# Patient Record
Sex: FEMALE | Race: WHITE | NOT HISPANIC OR LATINO | Employment: UNEMPLOYED | URBAN - NONMETROPOLITAN AREA
[De-identification: names, ages, dates, MRNs, and addresses within clinical notes are randomized per-mention and may not be internally consistent; named-entity substitution may affect disease eponyms.]

---

## 2017-08-06 ENCOUNTER — HOSPITAL ENCOUNTER (EMERGENCY)
Facility: HOSPITAL | Age: 27
Discharge: HOME | End: 2017-08-06
Attending: FAMILY MEDICINE | Admitting: FAMILY MEDICINE

## 2017-08-06 ENCOUNTER — APPOINTMENT (OUTPATIENT)
Dept: GENERAL RADIOLOGY | Facility: HOSPITAL | Age: 27
End: 2017-08-06

## 2017-08-06 VITALS
SYSTOLIC BLOOD PRESSURE: 117 MMHG | TEMPERATURE: 98.1 F | HEIGHT: 63 IN | DIASTOLIC BLOOD PRESSURE: 79 MMHG | RESPIRATION RATE: 15 BRPM | OXYGEN SATURATION: 99 % | BODY MASS INDEX: 18.25 KG/M2 | WEIGHT: 103 LBS | HEART RATE: 87 BPM

## 2017-08-06 DIAGNOSIS — S81.812A LACERATION OF LEFT LOWER EXTREMITY, INITIAL ENCOUNTER: Primary | ICD-10-CM

## 2017-08-06 LAB
ALBUMIN SERPL-MCNC: 4.7 G/DL (ref 3.5–5)
ALBUMIN/GLOB SERPL: 1.6 G/DL (ref 1.5–2.5)
ALP SERPL-CCNC: 60 U/L (ref 35–104)
ALT SERPL W P-5'-P-CCNC: 9 U/L (ref 10–36)
ANION GAP SERPL CALCULATED.3IONS-SCNC: 4 MMOL/L (ref 3.6–11.2)
AST SERPL-CCNC: 20 U/L (ref 10–30)
BASOPHILS # BLD AUTO: 0.02 10*3/MM3 (ref 0–0.3)
BASOPHILS NFR BLD AUTO: 0.3 % (ref 0–2)
BILIRUB SERPL-MCNC: 0.4 MG/DL (ref 0.2–1.8)
BUN BLD-MCNC: 9 MG/DL (ref 7–21)
BUN/CREAT SERPL: 13 (ref 7–25)
CALCIUM SPEC-SCNC: 9.7 MG/DL (ref 7.7–10)
CHLORIDE SERPL-SCNC: 114 MMOL/L (ref 99–112)
CK SERPL-CCNC: 85 U/L (ref 24–173)
CO2 SERPL-SCNC: 25 MMOL/L (ref 24.3–31.9)
CREAT BLD-MCNC: 0.69 MG/DL (ref 0.43–1.29)
DEPRECATED RDW RBC AUTO: 44.8 FL (ref 37–54)
EOSINOPHIL # BLD AUTO: 0.03 10*3/MM3 (ref 0–0.7)
EOSINOPHIL NFR BLD AUTO: 0.4 % (ref 0–5)
ERYTHROCYTE [DISTWIDTH] IN BLOOD BY AUTOMATED COUNT: 13.7 % (ref 11.5–14.5)
GFR SERPL CREATININE-BSD FRML MDRD: 104 ML/MIN/1.73
GLOBULIN UR ELPH-MCNC: 2.9 GM/DL
GLUCOSE BLD-MCNC: 105 MG/DL (ref 70–110)
HCG SERPL QL: NEGATIVE
HCT VFR BLD AUTO: 44.6 % (ref 37–47)
HGB BLD-MCNC: 14.9 G/DL (ref 12–16)
IMM GRANULOCYTES # BLD: 0.08 10*3/MM3 (ref 0–0.03)
IMM GRANULOCYTES NFR BLD: 1.1 % (ref 0–0.5)
LYMPHOCYTES # BLD AUTO: 1.49 10*3/MM3 (ref 1–3)
LYMPHOCYTES NFR BLD AUTO: 19.6 % (ref 21–51)
MCH RBC QN AUTO: 29.9 PG (ref 27–33)
MCHC RBC AUTO-ENTMCNC: 33.4 G/DL (ref 33–37)
MCV RBC AUTO: 89.4 FL (ref 80–94)
MONOCYTES # BLD AUTO: 0.5 10*3/MM3 (ref 0.1–0.9)
MONOCYTES NFR BLD AUTO: 6.6 % (ref 0–10)
MYOGLOBIN SERPL-MCNC: 26 NG/ML (ref 0–109)
NEUTROPHILS # BLD AUTO: 5.49 10*3/MM3 (ref 1.4–6.5)
NEUTROPHILS NFR BLD AUTO: 72 % (ref 30–70)
OSMOLALITY SERPL CALC.SUM OF ELEC: 284 MOSM/KG (ref 273–305)
PLATELET # BLD AUTO: 185 10*3/MM3 (ref 130–400)
PMV BLD AUTO: 12.3 FL (ref 6–10)
POTASSIUM BLD-SCNC: 3.8 MMOL/L (ref 3.5–5.3)
PROT SERPL-MCNC: 7.6 G/DL (ref 6–8)
RBC # BLD AUTO: 4.99 10*6/MM3 (ref 4.2–5.4)
SODIUM BLD-SCNC: 143 MMOL/L (ref 135–153)
WBC NRBC COR # BLD: 7.61 10*3/MM3 (ref 4.5–12.5)

## 2017-08-06 PROCEDURE — 96374 THER/PROPH/DIAG INJ IV PUSH: CPT

## 2017-08-06 PROCEDURE — 73590 X-RAY EXAM OF LOWER LEG: CPT | Performed by: RADIOLOGY

## 2017-08-06 PROCEDURE — 73590 X-RAY EXAM OF LOWER LEG: CPT

## 2017-08-06 PROCEDURE — 96361 HYDRATE IV INFUSION ADD-ON: CPT

## 2017-08-06 PROCEDURE — 99284 EMERGENCY DEPT VISIT MOD MDM: CPT

## 2017-08-06 PROCEDURE — 80053 COMPREHEN METABOLIC PANEL: CPT | Performed by: FAMILY MEDICINE

## 2017-08-06 PROCEDURE — 82550 ASSAY OF CK (CPK): CPT | Performed by: FAMILY MEDICINE

## 2017-08-06 PROCEDURE — 84703 CHORIONIC GONADOTROPIN ASSAY: CPT | Performed by: FAMILY MEDICINE

## 2017-08-06 PROCEDURE — 96376 TX/PRO/DX INJ SAME DRUG ADON: CPT

## 2017-08-06 PROCEDURE — 96375 TX/PRO/DX INJ NEW DRUG ADDON: CPT

## 2017-08-06 PROCEDURE — 25010000002 HYDROMORPHONE PER 4 MG: Performed by: FAMILY MEDICINE

## 2017-08-06 PROCEDURE — 85025 COMPLETE CBC W/AUTO DIFF WBC: CPT | Performed by: FAMILY MEDICINE

## 2017-08-06 PROCEDURE — 25010000002 LORAZEPAM PER 2 MG

## 2017-08-06 PROCEDURE — 25010000002 HYDROMORPHONE PER 4 MG

## 2017-08-06 PROCEDURE — 83874 ASSAY OF MYOGLOBIN: CPT | Performed by: FAMILY MEDICINE

## 2017-08-06 RX ORDER — LIDOCAINE HYDROCHLORIDE 20 MG/ML
INJECTION, SOLUTION INFILTRATION; PERINEURAL
Status: COMPLETED
Start: 2017-08-06 | End: 2017-08-06

## 2017-08-06 RX ORDER — CLINDAMYCIN HYDROCHLORIDE 300 MG/1
300 CAPSULE ORAL 3 TIMES DAILY
Qty: 30 CAPSULE | Refills: 0 | Status: SHIPPED | OUTPATIENT
Start: 2017-08-06 | End: 2017-08-16

## 2017-08-06 RX ORDER — HYDROCODONE BITARTRATE AND ACETAMINOPHEN 5; 325 MG/1; MG/1
1 TABLET ORAL EVERY 6 HOURS PRN
Qty: 12 TABLET | Refills: 0 | Status: SHIPPED | OUTPATIENT
Start: 2017-08-06

## 2017-08-06 RX ORDER — CLINDAMYCIN HYDROCHLORIDE 150 MG/1
300 CAPSULE ORAL EVERY 6 HOURS SCHEDULED
Status: DISCONTINUED | OUTPATIENT
Start: 2017-08-06 | End: 2017-08-06 | Stop reason: HOSPADM

## 2017-08-06 RX ORDER — LIDOCAINE HYDROCHLORIDE 20 MG/ML
10 INJECTION, SOLUTION INFILTRATION; PERINEURAL ONCE
Status: COMPLETED | OUTPATIENT
Start: 2017-08-06 | End: 2017-08-06

## 2017-08-06 RX ORDER — LORAZEPAM 2 MG/ML
INJECTION INTRAMUSCULAR
Status: COMPLETED
Start: 2017-08-06 | End: 2017-08-06

## 2017-08-06 RX ORDER — HYDROMORPHONE HYDROCHLORIDE 1 MG/ML
0.5 INJECTION, SOLUTION INTRAMUSCULAR; INTRAVENOUS; SUBCUTANEOUS ONCE
Status: COMPLETED | OUTPATIENT
Start: 2017-08-06 | End: 2017-08-06

## 2017-08-06 RX ORDER — LORAZEPAM 2 MG/ML
0.5 INJECTION INTRAMUSCULAR ONCE
Status: COMPLETED | OUTPATIENT
Start: 2017-08-06 | End: 2017-08-06

## 2017-08-06 RX ORDER — LIDOCAINE HYDROCHLORIDE 10 MG/ML
10 INJECTION, SOLUTION EPIDURAL; INFILTRATION; INTRACAUDAL; PERINEURAL ONCE
Status: COMPLETED | OUTPATIENT
Start: 2017-08-06 | End: 2017-08-06

## 2017-08-06 RX ORDER — LIDOCAINE HYDROCHLORIDE 10 MG/ML
INJECTION, SOLUTION INFILTRATION; PERINEURAL
Status: COMPLETED
Start: 2017-08-06 | End: 2017-08-06

## 2017-08-06 RX ORDER — LORAZEPAM 2 MG/ML
1 INJECTION INTRAMUSCULAR ONCE
Status: DISCONTINUED | OUTPATIENT
Start: 2017-08-06 | End: 2017-08-06

## 2017-08-06 RX ORDER — LIDOCAINE HYDROCHLORIDE 10 MG/ML
INJECTION, SOLUTION INFILTRATION; PERINEURAL
Status: DISCONTINUED
Start: 2017-08-06 | End: 2017-08-06 | Stop reason: HOSPADM

## 2017-08-06 RX ADMIN — LORAZEPAM 0.5 MG: 2 INJECTION INTRAMUSCULAR at 15:02

## 2017-08-06 RX ADMIN — LIDOCAINE HYDROCHLORIDE 10 ML: 20 INJECTION, SOLUTION INFILTRATION; PERINEURAL at 15:20

## 2017-08-06 RX ADMIN — HYDROMORPHONE HYDROCHLORIDE 0.5 MG: 1 INJECTION, SOLUTION INTRAMUSCULAR; INTRAVENOUS; SUBCUTANEOUS at 15:07

## 2017-08-06 RX ADMIN — HYDROMORPHONE HYDROCHLORIDE 0.5 MG: 1 INJECTION, SOLUTION INTRAMUSCULAR; INTRAVENOUS; SUBCUTANEOUS at 15:47

## 2017-08-06 RX ADMIN — HYDROMORPHONE HYDROCHLORIDE 1 MG: 1 INJECTION, SOLUTION INTRAMUSCULAR; INTRAVENOUS; SUBCUTANEOUS at 14:54

## 2017-08-06 RX ADMIN — LORAZEPAM 0.5 MG: 2 INJECTION INTRAMUSCULAR; INTRAVENOUS at 15:02

## 2017-08-06 RX ADMIN — SODIUM CHLORIDE 1000 ML: 9 INJECTION, SOLUTION INTRAVENOUS at 16:25

## 2017-08-06 RX ADMIN — LIDOCAINE HYDROCHLORIDE 10 ML: 10 INJECTION, SOLUTION INFILTRATION; PERINEURAL at 15:09

## 2017-08-06 RX ADMIN — LIDOCAINE HYDROCHLORIDE 10 ML: 10 INJECTION, SOLUTION EPIDURAL; INFILTRATION; INTRACAUDAL; PERINEURAL at 15:09

## 2017-08-06 RX ADMIN — Medication 1 MG: at 14:54

## 2017-08-06 NOTE — ED NOTES
X-ray at bedside for imaging at this time. Awaiting further orders, will continue to monitor.      Lidia Mendoza RN  08/06/17 0315

## 2017-08-06 NOTE — ED PROVIDER NOTES
Subjective   History of Present Illness  26 y/o F here after falling through a glass door. Pt denies any distal numbness/tingling. Pt has a lac on her LLE shin. Pt has h/o seizures and experienced a generalized tc seizure after seeing her injury. Pt states she sees Dr Aldridge and is compliant with her meds. Pt is not post-ictal upon arrival here.  Review of Systems   Constitutional: Negative for chills and fever.   Eyes: Negative for photophobia and visual disturbance.   Respiratory: Negative for cough, choking and wheezing.    Cardiovascular: Negative for chest pain and palpitations.   Gastrointestinal: Negative for abdominal distention, abdominal pain, nausea and vomiting.   Musculoskeletal: Negative for arthralgias, neck pain and neck stiffness.   Skin: Positive for wound. Negative for color change and pallor.        +laceration   Neurological: Positive for seizures. Negative for tremors, syncope, speech difficulty, weakness and numbness.   All other systems reviewed and are negative.      Past Medical History:   Diagnosis Date   • Ovarian cancer    • Seizures        Allergies   Allergen Reactions   • Amoxicillin    • Latex    • Penicillins        Past Surgical History:   Procedure Laterality Date   • ADENOIDECTOMY     • DILATATION AND CURETTAGE     • TONSILLECTOMY         History reviewed. No pertinent family history.    Social History     Social History   • Marital status: Legally      Spouse name: N/A   • Number of children: N/A   • Years of education: N/A     Social History Main Topics   • Smoking status: Current Every Day Smoker     Packs/day: 1.00   • Smokeless tobacco: None   • Alcohol use None      Comment: ocassionally   • Drug use: Yes     Special: Marijuana   • Sexual activity: Defer     Other Topics Concern   • None     Social History Narrative   • None           Objective   Physical Exam   Constitutional: She is oriented to person, place, and time. She appears well-developed and well-nourished.  "She is active.   HENT:   Head: Normocephalic and atraumatic.   Right Ear: Hearing and external ear normal.   Left Ear: Hearing and external ear normal.   Nose: Nose normal.   Mouth/Throat: Uvula is midline, oropharynx is clear and moist and mucous membranes are normal.   Eyes: Conjunctivae, EOM and lids are normal. Pupils are equal, round, and reactive to light.   Neck: Trachea normal, normal range of motion, full passive range of motion without pain and phonation normal. Neck supple.   Cardiovascular: Normal rate, regular rhythm and normal heart sounds.    Pulmonary/Chest: Effort normal and breath sounds normal.   Abdominal: Soft. Normal appearance. There is no tenderness.   Neurological: She is alert and oriented to person, place, and time. GCS eye subscore is 4. GCS verbal subscore is 5. GCS motor subscore is 6.   Skin: Skin is warm and dry.        Psychiatric: She has a normal mood and affect. Her speech is normal and behavior is normal. Judgment and thought content normal. Cognition and memory are normal.   Nursing note and vitals reviewed.      Laceration repair  Date/Time: 8/6/2017 4:13 PM  Performed by: JUDSON FATIMA  Authorized by: JUDSON FATIMA   Consent: Verbal consent obtained.  Consent given by: patient  Patient understanding: patient states understanding of the procedure being performed  Patient consent: the patient's understanding of the procedure matches consent given  Relevant documents: relevant documents present and verified  Test results: test results available and properly labeled  Site marked: the operative site was marked  Patient identity confirmed: verbally with patient and arm band  Time out: Immediately prior to procedure a \"time out\" was called to verify the correct patient, procedure, equipment, support staff and site/side marked as required.  Body area: lower extremity  Location details: left lower leg  Laceration length: 6 cm  Tendon involvement: none  Nerve " involvement: none  Vascular damage: no  Anesthesia: local infiltration    Anesthesia:  Local Anesthetic: lidocaine 1% with epinephrine  Anesthetic total: 40 mL    Sedation:  Patient sedated: no  Irrigation solution: saline  Irrigation method: syringe  Amount of cleaning: standard  Debridement: minimal  Degree of undermining: minimal  Skin closure: 3-0 nylon  Technique: simple  Approximation: close  Approximation difficulty: simple  Dressing: 4x4 sterile gauze, antibiotic ointment and pressure dressing  Patient tolerance: Patient tolerated the procedure well with no immediate complications               ED Course  ED Course      Pt required 18 stitches. Wound thoroughly irrigated with NS prior to closure with no fb visualized. X-ray showed concern for retained fb. Pt started on abx and referred to surgery.            MDM  Number of Diagnoses or Management Options  Laceration of left lower extremity, initial encounter: new and requires workup     Amount and/or Complexity of Data Reviewed  Clinical lab tests: reviewed and ordered  Tests in the radiology section of CPT®: reviewed and ordered  Independent visualization of images, tracings, or specimens: yes    Risk of Complications, Morbidity, and/or Mortality  Presenting problems: high  Diagnostic procedures: high  Management options: high    Patient Progress  Patient progress: stable      Final diagnoses:   Laceration of left lower extremity, initial encounter            Kiran De La Cruz MD  08/06/17 9204

## 2017-12-30 ENCOUNTER — HOSPITAL ENCOUNTER (EMERGENCY)
Facility: HOSPITAL | Age: 27
Discharge: LEFT BEFORE DISCHARGE | End: 2017-12-30

## 2017-12-30 VITALS
BODY MASS INDEX: 18.61 KG/M2 | OXYGEN SATURATION: 100 % | RESPIRATION RATE: 20 BRPM | SYSTOLIC BLOOD PRESSURE: 126 MMHG | HEIGHT: 63 IN | TEMPERATURE: 99.4 F | DIASTOLIC BLOOD PRESSURE: 75 MMHG | HEART RATE: 124 BPM | WEIGHT: 105 LBS

## 2019-06-28 ENCOUNTER — PREP FOR SURGERY (OUTPATIENT)
Dept: OTHER | Facility: HOSPITAL | Age: 29
End: 2019-06-28

## 2019-06-28 DIAGNOSIS — R10.2 PELVIC PAIN: Primary | ICD-10-CM

## 2019-06-28 RX ORDER — CLINDAMYCIN PHOSPHATE 900 MG/50ML
900 INJECTION INTRAVENOUS
Status: CANCELLED | OUTPATIENT
Start: 2019-06-28

## 2019-06-28 RX ORDER — SODIUM CHLORIDE 0.9 % (FLUSH) 0.9 %
3-10 SYRINGE (ML) INJECTION AS NEEDED
Status: CANCELLED | OUTPATIENT
Start: 2019-06-28

## 2019-07-02 ENCOUNTER — PREP FOR SURGERY (OUTPATIENT)
Dept: OTHER | Facility: HOSPITAL | Age: 29
End: 2019-07-02

## 2019-12-23 ENCOUNTER — APPOINTMENT (OUTPATIENT)
Dept: CT IMAGING | Facility: HOSPITAL | Age: 29
End: 2019-12-23

## 2019-12-23 ENCOUNTER — HOSPITAL ENCOUNTER (EMERGENCY)
Facility: HOSPITAL | Age: 29
Discharge: ANOTHER HOSPITAL | End: 2019-12-23
Attending: EMERGENCY MEDICINE | Admitting: EMERGENCY MEDICINE

## 2019-12-23 ENCOUNTER — APPOINTMENT (OUTPATIENT)
Dept: GENERAL RADIOLOGY | Facility: HOSPITAL | Age: 29
End: 2019-12-23

## 2019-12-23 VITALS
SYSTOLIC BLOOD PRESSURE: 111 MMHG | RESPIRATION RATE: 18 BRPM | HEIGHT: 63 IN | DIASTOLIC BLOOD PRESSURE: 79 MMHG | OXYGEN SATURATION: 98 % | WEIGHT: 105 LBS | BODY MASS INDEX: 18.61 KG/M2 | TEMPERATURE: 98 F | HEART RATE: 102 BPM

## 2019-12-23 DIAGNOSIS — T79.A21A TRAUMATIC COMPARTMENT SYNDROME OF RIGHT LOWER EXTREMITY, INITIAL ENCOUNTER (HCC): ICD-10-CM

## 2019-12-23 DIAGNOSIS — S82.141A CLOSED FRACTURE OF RIGHT TIBIAL PLATEAU, INITIAL ENCOUNTER: Primary | ICD-10-CM

## 2019-12-23 LAB
ALBUMIN SERPL-MCNC: 4.57 G/DL (ref 3.5–5.2)
ALBUMIN/GLOB SERPL: 1.6 G/DL
ALP SERPL-CCNC: 89 U/L (ref 39–117)
ALT SERPL W P-5'-P-CCNC: 15 U/L (ref 1–33)
ANION GAP SERPL CALCULATED.3IONS-SCNC: 18.5 MMOL/L (ref 5–15)
AST SERPL-CCNC: 18 U/L (ref 1–32)
BASOPHILS # BLD AUTO: 0.09 10*3/MM3 (ref 0–0.2)
BASOPHILS NFR BLD AUTO: 0.6 % (ref 0–1.5)
BILIRUB SERPL-MCNC: 0.8 MG/DL (ref 0.2–1.2)
BUN BLD-MCNC: 13 MG/DL (ref 6–20)
BUN/CREAT SERPL: 18.3 (ref 7–25)
CALCIUM SPEC-SCNC: 9.4 MG/DL (ref 8.6–10.5)
CHLORIDE SERPL-SCNC: 102 MMOL/L (ref 98–107)
CK SERPL-CCNC: 124 U/L (ref 20–180)
CO2 SERPL-SCNC: 17.5 MMOL/L (ref 22–29)
CREAT BLD-MCNC: 0.71 MG/DL (ref 0.57–1)
DEPRECATED RDW RBC AUTO: 44.5 FL (ref 37–54)
EOSINOPHIL # BLD AUTO: 0 10*3/MM3 (ref 0–0.4)
EOSINOPHIL NFR BLD AUTO: 0 % (ref 0.3–6.2)
ERYTHROCYTE [DISTWIDTH] IN BLOOD BY AUTOMATED COUNT: 14.7 % (ref 12.3–15.4)
GFR SERPL CREATININE-BSD FRML MDRD: 99 ML/MIN/1.73
GLOBULIN UR ELPH-MCNC: 2.8 GM/DL
GLUCOSE BLD-MCNC: 104 MG/DL (ref 65–99)
HCG SERPL QL: NEGATIVE
HCT VFR BLD AUTO: 38.3 % (ref 34–46.6)
HGB BLD-MCNC: 13.1 G/DL (ref 12–15.9)
IMM GRANULOCYTES # BLD AUTO: 0.21 10*3/MM3 (ref 0–0.05)
IMM GRANULOCYTES NFR BLD AUTO: 1.4 % (ref 0–0.5)
LYMPHOCYTES # BLD AUTO: 3.07 10*3/MM3 (ref 0.7–3.1)
LYMPHOCYTES NFR BLD AUTO: 20.1 % (ref 19.6–45.3)
MCH RBC QN AUTO: 28.4 PG (ref 26.6–33)
MCHC RBC AUTO-ENTMCNC: 34.2 G/DL (ref 31.5–35.7)
MCV RBC AUTO: 82.9 FL (ref 79–97)
MONOCYTES # BLD AUTO: 1.11 10*3/MM3 (ref 0.1–0.9)
MONOCYTES NFR BLD AUTO: 7.3 % (ref 5–12)
NEUTROPHILS # BLD AUTO: 10.82 10*3/MM3 (ref 1.7–7)
NEUTROPHILS NFR BLD AUTO: 70.6 % (ref 42.7–76)
NRBC BLD AUTO-RTO: 0 /100 WBC (ref 0–0.2)
PLATELET # BLD AUTO: 247 10*3/MM3 (ref 140–450)
PMV BLD AUTO: 10.7 FL (ref 6–12)
POTASSIUM BLD-SCNC: 3.2 MMOL/L (ref 3.5–5.2)
PROT SERPL-MCNC: 7.4 G/DL (ref 6–8.5)
RBC # BLD AUTO: 4.62 10*6/MM3 (ref 3.77–5.28)
SODIUM BLD-SCNC: 138 MMOL/L (ref 136–145)
WBC NRBC COR # BLD: 15.3 10*3/MM3 (ref 3.4–10.8)

## 2019-12-23 PROCEDURE — 73700 CT LOWER EXTREMITY W/O DYE: CPT

## 2019-12-23 PROCEDURE — 85025 COMPLETE CBC W/AUTO DIFF WBC: CPT | Performed by: EMERGENCY MEDICINE

## 2019-12-23 PROCEDURE — 96374 THER/PROPH/DIAG INJ IV PUSH: CPT

## 2019-12-23 PROCEDURE — 82550 ASSAY OF CK (CPK): CPT | Performed by: EMERGENCY MEDICINE

## 2019-12-23 PROCEDURE — 73562 X-RAY EXAM OF KNEE 3: CPT | Performed by: RADIOLOGY

## 2019-12-23 PROCEDURE — 73562 X-RAY EXAM OF KNEE 3: CPT

## 2019-12-23 PROCEDURE — 25010000002 MORPHINE PER 10 MG: Performed by: EMERGENCY MEDICINE

## 2019-12-23 PROCEDURE — 84703 CHORIONIC GONADOTROPIN ASSAY: CPT | Performed by: EMERGENCY MEDICINE

## 2019-12-23 PROCEDURE — 80053 COMPREHEN METABOLIC PANEL: CPT | Performed by: EMERGENCY MEDICINE

## 2019-12-23 PROCEDURE — 99284 EMERGENCY DEPT VISIT MOD MDM: CPT

## 2019-12-23 RX ORDER — SODIUM CHLORIDE 0.9 % (FLUSH) 0.9 %
10 SYRINGE (ML) INJECTION AS NEEDED
Status: DISCONTINUED | OUTPATIENT
Start: 2019-12-23 | End: 2019-12-24 | Stop reason: HOSPADM

## 2019-12-23 RX ORDER — IBUPROFEN 600 MG/1
600 TABLET ORAL ONCE
Status: COMPLETED | OUTPATIENT
Start: 2019-12-23 | End: 2019-12-23

## 2019-12-23 RX ORDER — KETOROLAC TROMETHAMINE 30 MG/ML
15 INJECTION, SOLUTION INTRAMUSCULAR; INTRAVENOUS ONCE
Status: DISCONTINUED | OUTPATIENT
Start: 2019-12-23 | End: 2019-12-23

## 2019-12-23 RX ADMIN — IBUPROFEN 600 MG: 600 TABLET ORAL at 20:45

## 2019-12-23 RX ADMIN — MORPHINE SULFATE 4 MG: 4 INJECTION, SOLUTION INTRAMUSCULAR; INTRAVENOUS at 23:14

## 2019-12-24 NOTE — ED NOTES
[facility] here to transport pt to  ER. Pt is agreeable and pt denies any needs at this time. V/S are stable. Pedal pulses are present bilaterally, denies any numbness. Pt leaving ER with [facility] at this time.      Evie Carnes, RN  12/23/19 8056

## 2019-12-24 NOTE — ED NOTES
Ethan with [facility] said [facility] accepted the patient with an 11 min ETA.     Anurag Marinelli  12/23/19 9514

## 2019-12-24 NOTE — ED NOTES
I called [facility] about transferring the patient to  Emergency Department, [facility] is going to have the  check weather and call us back.     Anurag Marinelli  12/23/19 8603

## 2019-12-24 NOTE — ED NOTES
Trauma coordinator with [facility] took call for Dr. Maldonado.     Anurag Marinelli  12/23/19 1540

## 2019-12-24 NOTE — ED NOTES
I called [facility] about transferring the patient.     Anurag Marinelli  12/23/19 5638

## 2019-12-24 NOTE — ED PROVIDER NOTES
Subjective   29-year-old female presents complaining of right knee pain.  She states that she slipped on a piece of plastic and fell down 4 stairs, hitting her right knee on the wooden porch.  She now has pain with any sort of movement.  She denies injury elsewhere.  She had no head trauma or loss of consciousness.  No other complaints at this time.      History provided by:  Patient   used: No        Review of Systems   Constitutional: Negative.  Negative for fever.   HENT: Negative.    Respiratory: Negative.    Cardiovascular: Negative.  Negative for chest pain.   Gastrointestinal: Negative.  Negative for abdominal pain.   Genitourinary: Negative for dysuria.   Musculoskeletal: Positive for arthralgias and myalgias.   Neurological: Negative.    All other systems reviewed and are negative.      Past Medical History:   Diagnosis Date   • Ovarian cancer (CMS/[facility])    • Seizures (CMS/[facility])        Allergies   Allergen Reactions   • Amoxicillin    • Latex    • Penicillins    • Sulfa Antibiotics        Past Surgical History:   Procedure Laterality Date   • ADENOIDECTOMY     • DILATATION AND CURETTAGE     • TONSILLECTOMY         No family history on file.    Social History     Socioeconomic History   • Marital status: Legally      Spouse name: Not on file   • Number of children: Not on file   • Years of education: Not on file   • Highest education level: Not on file   Tobacco Use   • Smoking status: Current Every Day Smoker     Packs/day: 1.00     Types: Cigarettes   Substance and Sexual Activity   • Drug use: Yes     Types: Marijuana   • Sexual activity: Defer           Objective   Physical Exam   Constitutional: She is oriented to person, place, and time. She appears well-developed and well-nourished. She appears distressed.   HENT:   Head: Normocephalic and atraumatic.   Right Ear: External ear normal.   Left Ear: External ear normal.   Nose: Nose normal.   Eyes: Pupils are equal, round, and  reactive to light. Conjunctivae and EOM are normal.   Neck: Normal range of motion. Neck supple. No JVD present. No tracheal deviation present.   Cardiovascular: Normal rate, regular rhythm and normal heart sounds.   No murmur heard.  Pulmonary/Chest: Effort normal and breath sounds normal. No respiratory distress. She has no wheezes.   Abdominal: Soft. Bowel sounds are normal. There is no tenderness.   Musculoskeletal: Normal range of motion. She exhibits no edema or deformity.   RLE: diffuse R knee ttp worst laterally, + swelling, pain limited ROM, neuro intact distally with 2+ DP/PT pulses, no other bony ttp   Neurological: She is alert and oriented to person, place, and time. No cranial nerve deficit.   Skin: Skin is warm and dry. No rash noted. She is not diaphoretic. No erythema. No pallor.   Psychiatric: Her behavior is normal. Thought content normal. Her mood appears anxious.   Nursing note and vitals reviewed.      Procedures           ED Course  ED Course as of Dec 23 2305   Mon Dec 23, 2019   2206 Patient refusing any labs at this time, will do CT shielded. Doubt pregnancy given OCPs.    [DH]      ED Course User Index  [DH] Andrei Maldonado MD                      No data recorded                        MDM  Number of Diagnoses or Management Options  Closed fracture of right tibial plateau, initial encounter:   Traumatic compartment syndrome of right lower extremity, initial encounter (CMS/[facility]):   Diagnosis management comments: Work-up reveals a complex, displaced and comminuted right sided tibial plateau fracture.  Patient complains of continued increasing pain, throbbing to the lateral knee and calf, with paresthesias to the lateral calf and dorsum of the foot.  The anterior and lateral compartments are firm to the touch.  Additionally, she has severe pain with passive range of motion at the ankle, especially dorsiflexion, and refuses/unable to perform any active ROM.  This is all concerning  for a possible developing compartment syndrome.  Discussed with Dr. Acosta who recommends transfer to a trauma center.  Discussed with trauma coordinator at  who accepted patient for transfer, attending Dr. Ge.       Amount and/or Complexity of Data Reviewed  Clinical lab tests: ordered  Tests in the radiology section of CPT®: ordered and reviewed  Independent visualization of images, tracings, or specimens: yes        Final diagnoses:   Closed fracture of right tibial plateau, initial encounter   Traumatic compartment syndrome of right lower extremity, initial encounter (CMS/[facility])              Andrei Maldonado MD  12/23/19 [id number]

## 2019-12-24 NOTE — ED NOTES
"Went to get labs on patient and she said no. I explained the doctor wanted an IV and labs. Patient stated \"I'm not being stuck, I just want to do my CT and get out of here.\"  Dr Maldonado and RN made aware.      Leena Garcia  12/23/19 5548    "

## 2020-02-04 ENCOUNTER — TRANSCRIBE ORDERS (OUTPATIENT)
Dept: PHYSICAL THERAPY | Facility: CLINIC | Age: 30
End: 2020-02-04

## 2020-02-04 DIAGNOSIS — S82.141D CLOSED FRACTURE OF RIGHT TIBIAL PLATEAU WITH ROUTINE HEALING, SUBSEQUENT ENCOUNTER: Primary | ICD-10-CM

## 2020-07-07 ENCOUNTER — OFFICE VISIT (OUTPATIENT)
Dept: FAMILY MEDICINE CLINIC | Facility: CLINIC | Age: 30
End: 2020-07-07

## 2020-07-07 VITALS
HEIGHT: 63 IN | TEMPERATURE: 97.2 F | OXYGEN SATURATION: 98 % | WEIGHT: 117.6 LBS | DIASTOLIC BLOOD PRESSURE: 74 MMHG | BODY MASS INDEX: 20.84 KG/M2 | RESPIRATION RATE: 19 BRPM | HEART RATE: 83 BPM | SYSTOLIC BLOOD PRESSURE: 116 MMHG

## 2020-07-07 DIAGNOSIS — F43.10 PTSD (POST-TRAUMATIC STRESS DISORDER): ICD-10-CM

## 2020-07-07 DIAGNOSIS — Z76.89 ENCOUNTER TO ESTABLISH CARE: Primary | ICD-10-CM

## 2020-07-07 DIAGNOSIS — M79.604 RIGHT LEG PAIN: ICD-10-CM

## 2020-07-07 DIAGNOSIS — R56.9 SEIZURE (HCC): ICD-10-CM

## 2020-07-07 DIAGNOSIS — F31.9 BIPOLAR 1 DISORDER (HCC): ICD-10-CM

## 2020-07-07 DIAGNOSIS — N83.209 CYST OF OVARY, UNSPECIFIED LATERALITY: ICD-10-CM

## 2020-07-07 PROCEDURE — 99203 OFFICE O/P NEW LOW 30 MIN: CPT | Performed by: NURSE PRACTITIONER

## 2020-07-07 RX ORDER — LAMOTRIGINE 25(42)-100
1 KIT ORAL TAKE AS DIRECTED
Qty: 49 TABLET | Refills: 0 | Status: SHIPPED | OUTPATIENT
Start: 2020-07-07 | End: 2020-09-22 | Stop reason: SDUPTHER
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
## 2020-07-08 PROBLEM — N83.209 CYST OF OVARY: Status: ACTIVE | Noted: 2020-07-08

## 2020-07-08 PROBLEM — F43.10 PTSD (POST-TRAUMATIC STRESS DISORDER): Status: ACTIVE | Noted: 2020-07-08

## 2020-07-08 PROBLEM — F31.9 BIPOLAR 1 DISORDER (HCC): Status: ACTIVE | Noted: 2020-07-08

## 2020-07-08 PROBLEM — M79.604 RIGHT LEG PAIN: Status: ACTIVE | Noted: 2020-07-08

## 2020-07-08 PROBLEM — R56.9 SEIZURE (HCC): Status: ACTIVE | Noted: 2020-07-08

## 2020-07-13 DIAGNOSIS — M25.561 ACUTE PAIN OF RIGHT KNEE: Primary | ICD-10-CM

## 2020-07-15 ENCOUNTER — OFFICE VISIT (OUTPATIENT)
Dept: ORTHOPEDIC SURGERY | Facility: CLINIC | Age: 30
End: 2020-07-15

## 2020-07-15 VITALS — HEIGHT: 63 IN | BODY MASS INDEX: 29.57 KG/M2 | WEIGHT: 166.9 LBS

## 2020-07-15 DIAGNOSIS — S82.141A TIBIAL PLATEAU FRACTURE, RIGHT, CLOSED, INITIAL ENCOUNTER: ICD-10-CM

## 2020-07-15 DIAGNOSIS — M25.561 ACUTE PAIN OF RIGHT KNEE: Primary | ICD-10-CM

## 2020-07-15 PROCEDURE — 99203 OFFICE O/P NEW LOW 30 MIN: CPT | Performed by: ORTHOPAEDIC SURGERY

## 2020-07-15 RX ORDER — METHYLPREDNISOLONE 4 MG/1
TABLET ORAL
Qty: 21 TABLET | Refills: 0 | Status: SHIPPED | OUTPATIENT
Start: 2020-07-15
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
## 2020-07-20 ENCOUNTER — TREATMENT (OUTPATIENT)
Dept: PHYSICAL THERAPY | Facility: CLINIC | Age: 30
End: 2020-07-20

## 2020-07-20 DIAGNOSIS — R56.9 SEIZURE (HCC): ICD-10-CM

## 2020-07-20 DIAGNOSIS — M79.604 RIGHT LEG PAIN: ICD-10-CM

## 2020-07-20 DIAGNOSIS — S82.141A TIBIAL PLATEAU FRACTURE, RIGHT, CLOSED, INITIAL ENCOUNTER: ICD-10-CM

## 2020-07-20 DIAGNOSIS — M25.561 ACUTE PAIN OF RIGHT KNEE: Primary | ICD-10-CM

## 2020-07-20 PROCEDURE — 97162 PT EVAL MOD COMPLEX 30 MIN: CPT | Performed by: PHYSICAL THERAPIST
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
## 2020-07-22 ENCOUNTER — APPOINTMENT (OUTPATIENT)
Dept: CT IMAGING | Facility: HOSPITAL | Age: 30
End: 2020-07-22

## 2020-07-27 ENCOUNTER — TELEPHONE (OUTPATIENT)
Dept: PHYSICAL THERAPY | Facility: CLINIC | Age: 30
End: 2020-07-27

## 2020-07-27 DIAGNOSIS — M25.561 ACUTE PAIN OF RIGHT KNEE: Primary | ICD-10-CM

## 2020-07-27 DIAGNOSIS — R56.9 SEIZURE (HCC): ICD-10-CM

## 2020-07-27 DIAGNOSIS — M79.604 RIGHT LEG PAIN: ICD-10-CM

## 2020-07-27 DIAGNOSIS — S82.141A TIBIAL PLATEAU FRACTURE, RIGHT, CLOSED, INITIAL ENCOUNTER: ICD-10-CM

## 2020-07-30 ENCOUNTER — TELEPHONE (OUTPATIENT)
Dept: PHYSICAL THERAPY | Facility: CLINIC | Age: 30
End: 2020-07-30

## 2020-09-22 DIAGNOSIS — R56.9 SEIZURE (HCC): ICD-10-CM

## 2020-09-22 RX ORDER — LAMOTRIGINE 25(42)-100
1 KIT ORAL TAKE AS DIRECTED
Qty: 49 TABLET | Refills: 0 | Status: SHIPPED | OUTPATIENT
Start: 2020-09-22

## 2020-09-22 NOTE — TELEPHONE ENCOUNTER
Pt has been noncompliant with treatment plan and has not seen neuro as ordered. One refill will be provided but no additional refills until pts sees neuro.

## 2020-09-22 NOTE — TELEPHONE ENCOUNTER
Caller: Alma Rosario    Relationship: Self    Best call back number: [phone]    Medication needed:   Requested Prescriptions     Pending Prescriptions Disp Refills   • lamoTRIgine Starter Kit-Orange 42 x 25 MG & 7 x 100 MG kit 49 tablet 0     Sig: Take 1 tablet by mouth Take As Directed.       When do you need the refill by: ASAP    What details did the patient provide when requesting the medication: PATIENT IS COMPLETELY OUT OF HER SEIZURE MEDICATIONS AND HAD A SEIZURE LAST NIGHT. WOULD LIKE TO GET REFILL AS SOON AS POSSIBLE.     Does the patient have less than a 3 day supply:  [x] Yes  [] No    What is the patient's preferred pharmacy: [facility] [address] [place] [address] [phone] [facility] [phone]

## 2020-10-26 ENCOUNTER — OFFICE VISIT (OUTPATIENT)
Dept: FAMILY MEDICINE CLINIC | Facility: CLINIC | Age: 30
End: 2020-10-26

## 2020-10-26 VITALS
BODY MASS INDEX: 24.27 KG/M2 | SYSTOLIC BLOOD PRESSURE: 128 MMHG | OXYGEN SATURATION: 97 % | WEIGHT: 137 LBS | DIASTOLIC BLOOD PRESSURE: 76 MMHG | HEIGHT: 63 IN | HEART RATE: 137 BPM | TEMPERATURE: 97.3 F

## 2020-10-26 DIAGNOSIS — L02.411 ABSCESS OF RIGHT AXILLA: Primary | ICD-10-CM

## 2020-10-26 PROCEDURE — 99213 OFFICE O/P EST LOW 20 MIN: CPT | Performed by: NURSE PRACTITIONER

## 2020-10-26 RX ORDER — CLINDAMYCIN HYDROCHLORIDE 300 MG/1
300 CAPSULE ORAL 4 TIMES DAILY
Qty: 40 CAPSULE | Refills: 0 | Status: SHIPPED | OUTPATIENT
Start: 2020-10-26 | End: 2020-11-05

## 2020-10-26 RX ORDER — FLUCONAZOLE 150 MG/1
TABLET ORAL
Qty: 2 TABLET | Refills: 0 | Status: SHIPPED | OUTPATIENT
Start: 2020-10-26

## 2020-10-26 NOTE — PROGRESS NOTES
"Chief Complaint   Patient presents with   • Extremity Weakness     right large boil       Subjective:  Alma Rosario is a 30 y.o. female who presents for c/o \"large boil\" to right upper arm near axilla that has been present since this past weekend. Reports having a similar boil approx 1 mo ago that she opened and drained and it resolved. Reports that she is living with some friends and the house is not as clean as it should be and she thinks that contributed to the boil. Reports using a razor to shave that was probably not clean. Pt reports she is moving back to her hometown in [place] this weekend and will re-establish with PCP to get referred to neuro for her seizures. Reports seizures are currently controlled.       The following portions of the patient's history were reviewed and updated as appropriate: allergies, current medications, past family history, past medical history, past social history, past surgical history and problem list.    Rash  This is a new problem. The current episode started in the past 7 days. The problem has been gradually worsening since onset. The affected locations include the right axilla. The rash is characterized by pain and redness. It is unknown if there was an exposure to a precipitant. Pertinent negatives include no fever or shortness of breath. Treatments tried: warm compresses and neosporin. The treatment provided no relief.        Past Medical History:   Diagnosis Date   • Anxiety    • Depression    • Ovarian cancer (CMS/[facility]) 2019    biopsy, has not been back since then   • Seizures (CMS/[facility])     07-20-20: last seizure was 2 weeks ago, does not know when coming on. Unsure of what happens when seizure occurs         Current Outpatient Medications:   •  lamoTRIgine Starter Kit-Orange 42 x 25 MG & 7 x 100 MG kit, Take 1 tablet by mouth Take As Directed., Disp: 49 tablet, Rfl: 0  •  clindamycin (Cleocin) 300 MG capsule, Take 1 capsule by mouth 4 (Four) Times a Day for 10 days., Disp: " "40 capsule, Rfl: 0  •  DIAZEPAM RE, Insert 20 mg into the rectum As Needed (seizures). Patient reports she is to use rectal gel for more than 2 seizures., Disp: , Rfl:   •  fluconazole (DIFLUCAN) 150 MG tablet, Take one tab PO at onset of symptoms, then repeat in 72 hours if symptoms persist, Disp: 2 tablet, Rfl: 0  •  gabapentin (NEURONTIN) 800 MG tablet, Take 800 mg by mouth Daily., Disp: , Rfl:   •  HYDROcodone-acetaminophen (NORCO) 5-325 MG per tablet, Take 1 tablet by mouth Every 6 (Six) Hours As Needed for Mild Pain (1-3)., Disp: 12 tablet, Rfl: 0  •  methylPREDNISolone (MEDROL, BOOGIE,) 4 MG tablet, Use as directed by package instructions, Disp: 21 tablet, Rfl: 0  •  mupirocin (Bactroban) 2 % ointment, Apply  topically to the appropriate area as directed 3 (Three) Times a Day., Disp: 30 g, Rfl: 0  •  naproxen (NAPROSYN) 250 MG tablet, Take 1 tablet by mouth 2 (Two) Times a Day As Needed for mild pain (1-3)., Disp: 15 tablet, Rfl: 0  •  norgestimate-ethinyl estradiol (SPRINTEC 28) 0.25-35 MG-MCG per tablet, Take 1 package by mouth Daily., Disp: , Rfl:   •  sertraline (ZOLOFT) 25 MG tablet, Take 50 mg by mouth 2 (Two) Times a Day., Disp: , Rfl:     Review of Systems    Review of Systems   Constitutional: Negative for fever.   Respiratory: Negative for chest tightness and shortness of breath.    Cardiovascular: Negative for chest pain.   Skin: Positive for color change (erythema) and rash (golf ball size abscess).   Neurological: Negative for dizziness and light-headedness.   Hematological: Negative for adenopathy.       Objective  Vitals:    10/26/20 1441 10/26/20 1521   BP: 128/76    Pulse: (!) 137    Temp:  97.3 °F (36.3 °C)   SpO2: 97%    Weight: 62.1 kg (137 lb)    Height: 160 cm (63\")    PainSc:   3    PainLoc: Arm  Comment: right        Physical Exam  Vitals signs and nursing note reviewed.   Constitutional:       Appearance: Normal appearance. She is normal weight.   HENT:      Head: Normocephalic and " atraumatic.   Eyes:      Conjunctiva/sclera: Conjunctivae normal.      Pupils: Pupils are equal, round, and reactive to light.   Neck:      Musculoskeletal: Normal range of motion.   Cardiovascular:      Rate and Rhythm: Regular rhythm. Tachycardia present.      Heart sounds: Normal heart sounds.   Pulmonary:      Effort: Pulmonary effort is normal.      Breath sounds: Normal breath sounds.   Musculoskeletal: Normal range of motion.   Skin:     General: Skin is warm and dry.      Findings: Erythema and lesion (large golf ball sized fluctuant nodule R axilla area that is ttp; no drainage or streaking present) present.             Comments: Offered I&D but pt declined   Neurological:      General: No focal deficit present.      Mental Status: She is alert and oriented to person, place, and time.   Psychiatric:         Mood and Affect: Mood normal.         Behavior: Behavior normal.         No results found for this or any previous visit (from the past 672 hour(s)).    No Images in the past 120 days found..       Diagnoses and all orders for this visit:    1. Abscess of right axilla (Primary)  -     clindamycin (Cleocin) 300 MG capsule; Take 1 capsule by mouth 4 (Four) Times a Day for 10 days.  Dispense: 40 capsule; Refill: 0  -     mupirocin (Bactroban) 2 % ointment; Apply  topically to the appropriate area as directed 3 (Three) Times a Day.  Dispense: 30 g; Refill: 0  -     fluconazole (DIFLUCAN) 150 MG tablet; Take one tab PO at onset of symptoms, then repeat in 72 hours if symptoms persist  Dispense: 2 tablet; Refill: 0    - Advised to continue warm compresses  - Start clindamycin as directed - take with food   - Start OTC probiotic  by 2 hours of abx.  - Apply mupirocin to area TID  - Keep area clean and dry with soap and water  - Offered to I&D area of concern but pt declined.  2. Monitor area closely and RTC or f/u if lesions worsen.      This document has been electronically signed by Kimmie Villasenor  APRN on October 26, 2020 15:29 CDT